# Patient Record
Sex: FEMALE | Race: WHITE | Employment: FULL TIME | URBAN - NONMETROPOLITAN AREA
[De-identification: names, ages, dates, MRNs, and addresses within clinical notes are randomized per-mention and may not be internally consistent; named-entity substitution may affect disease eponyms.]

---

## 2018-05-14 PROBLEM — K35.80 ACUTE APPENDICITIS: Status: ACTIVE | Noted: 2018-05-14

## 2018-05-14 PROBLEM — K35.30 ACUTE APPENDICITIS WITH LOCALIZED PERITONITIS: Status: ACTIVE | Noted: 2018-05-14

## 2019-01-28 ENCOUNTER — HOSPITAL ENCOUNTER (EMERGENCY)
Age: 29
Discharge: HOME | End: 2019-01-28
Attending: EMERGENCY MEDICINE

## 2019-01-28 VITALS
HEART RATE: 77 BPM | WEIGHT: 207 LBS | HEIGHT: 61 IN | BODY MASS INDEX: 39.08 KG/M2 | RESPIRATION RATE: 16 BRPM | SYSTOLIC BLOOD PRESSURE: 112 MMHG | DIASTOLIC BLOOD PRESSURE: 64 MMHG | OXYGEN SATURATION: 97 % | TEMPERATURE: 98.1 F

## 2019-01-28 DIAGNOSIS — G43.009 MIGRAINE WITHOUT AURA AND WITHOUT STATUS MIGRAINOSUS, NOT INTRACTABLE: Primary | ICD-10-CM

## 2019-01-28 PROCEDURE — 6370000000 HC RX 637 (ALT 250 FOR IP): Performed by: EMERGENCY MEDICINE

## 2019-01-28 PROCEDURE — 6360000002 HC RX W HCPCS: Performed by: EMERGENCY MEDICINE

## 2019-01-28 PROCEDURE — 96372 THER/PROPH/DIAG INJ SC/IM: CPT

## 2019-01-28 PROCEDURE — 99283 EMERGENCY DEPT VISIT LOW MDM: CPT

## 2019-01-28 RX ORDER — DEXAMETHASONE 4 MG/1
10 TABLET ORAL ONCE
Status: COMPLETED | OUTPATIENT
Start: 2019-01-28 | End: 2019-01-28

## 2019-01-28 RX ORDER — RIZATRIPTAN BENZOATE 10 MG/1
10 TABLET ORAL
Qty: 9 TABLET | Refills: 5 | Status: SHIPPED | OUTPATIENT
Start: 2019-01-28 | End: 2019-03-04 | Stop reason: SDUPTHER

## 2019-01-28 RX ORDER — DIPHENHYDRAMINE HCL 25 MG
50 TABLET ORAL ONCE
Status: COMPLETED | OUTPATIENT
Start: 2019-01-28 | End: 2019-01-28

## 2019-01-28 RX ADMIN — DEXAMETHASONE 10 MG: 4 TABLET ORAL at 12:05

## 2019-01-28 RX ADMIN — DIPHENHYDRAMINE HYDROCHLORIDE 50 MG: 25 CAPSULE ORAL at 12:06

## 2019-01-28 RX ADMIN — PROCHLORPERAZINE EDISYLATE 10 MG: 5 INJECTION INTRAMUSCULAR; INTRAVENOUS at 12:06

## 2019-01-28 ASSESSMENT — PAIN SCALES - GENERAL
PAINLEVEL_OUTOF10: 7
PAINLEVEL_OUTOF10: 5

## 2019-01-28 ASSESSMENT — ENCOUNTER SYMPTOMS
PHOTOPHOBIA: 1
NAUSEA: 1

## 2019-03-04 ENCOUNTER — APPOINTMENT (OUTPATIENT)
Dept: CT IMAGING | Age: 29
End: 2019-03-04

## 2019-03-04 ENCOUNTER — HOSPITAL ENCOUNTER (EMERGENCY)
Age: 29
Discharge: HOME | End: 2019-03-04
Attending: EMERGENCY MEDICINE

## 2019-03-04 VITALS
HEART RATE: 82 BPM | TEMPERATURE: 98.1 F | BODY MASS INDEX: 43.43 KG/M2 | OXYGEN SATURATION: 98 % | SYSTOLIC BLOOD PRESSURE: 122 MMHG | WEIGHT: 230 LBS | DIASTOLIC BLOOD PRESSURE: 78 MMHG | RESPIRATION RATE: 16 BRPM | HEIGHT: 61 IN

## 2019-03-04 DIAGNOSIS — G43.109 MIGRAINE WITH AURA AND WITHOUT STATUS MIGRAINOSUS, NOT INTRACTABLE: Primary | ICD-10-CM

## 2019-03-04 PROCEDURE — 96375 TX/PRO/DX INJ NEW DRUG ADDON: CPT

## 2019-03-04 PROCEDURE — 99284 EMERGENCY DEPT VISIT MOD MDM: CPT

## 2019-03-04 PROCEDURE — 6360000002 HC RX W HCPCS: Performed by: EMERGENCY MEDICINE

## 2019-03-04 PROCEDURE — 6370000000 HC RX 637 (ALT 250 FOR IP): Performed by: EMERGENCY MEDICINE

## 2019-03-04 PROCEDURE — 96374 THER/PROPH/DIAG INJ IV PUSH: CPT

## 2019-03-04 PROCEDURE — 70450 CT HEAD/BRAIN W/O DYE: CPT

## 2019-03-04 PROCEDURE — 2580000003 HC RX 258: Performed by: EMERGENCY MEDICINE

## 2019-03-04 RX ORDER — SODIUM CHLORIDE 0.9 % (FLUSH) 0.9 %
10 SYRINGE (ML) INJECTION 2 TIMES DAILY
Status: DISCONTINUED | OUTPATIENT
Start: 2019-03-04 | End: 2019-03-04 | Stop reason: HOSPADM

## 2019-03-04 RX ORDER — RIZATRIPTAN BENZOATE 10 MG/1
10 TABLET ORAL
Qty: 9 TABLET | Refills: 1 | Status: SHIPPED | OUTPATIENT
Start: 2019-03-04 | End: 2019-05-05

## 2019-03-04 RX ORDER — DIPHENHYDRAMINE HCL 25 MG
50 CAPSULE ORAL ONCE
Status: COMPLETED | OUTPATIENT
Start: 2019-03-04 | End: 2019-03-04

## 2019-03-04 RX ORDER — DIPHENHYDRAMINE HYDROCHLORIDE 50 MG/ML
25 INJECTION INTRAMUSCULAR; INTRAVENOUS ONCE
Status: DISCONTINUED | OUTPATIENT
Start: 2019-03-04 | End: 2019-03-04

## 2019-03-04 RX ORDER — DEXAMETHASONE SODIUM PHOSPHATE 4 MG/ML
10 INJECTION, SOLUTION INTRA-ARTICULAR; INTRALESIONAL; INTRAMUSCULAR; INTRAVENOUS; SOFT TISSUE ONCE
Status: COMPLETED | OUTPATIENT
Start: 2019-03-04 | End: 2019-03-04

## 2019-03-04 RX ADMIN — DEXAMETHASONE SODIUM PHOSPHATE 10 MG: 4 INJECTION, SOLUTION INTRAMUSCULAR; INTRAVENOUS at 09:31

## 2019-03-04 RX ADMIN — SODIUM CHLORIDE, PRESERVATIVE FREE 30 ML: 5 INJECTION INTRAVENOUS at 09:27

## 2019-03-04 RX ADMIN — DIPHENHYDRAMINE HYDROCHLORIDE 50 MG: 25 CAPSULE ORAL at 10:15

## 2019-03-04 RX ADMIN — PROCHLORPERAZINE EDISYLATE 10 MG: 5 INJECTION INTRAMUSCULAR; INTRAVENOUS at 09:31

## 2019-03-04 ASSESSMENT — PAIN DESCRIPTION - LOCATION: LOCATION: HEAD

## 2019-03-04 ASSESSMENT — ENCOUNTER SYMPTOMS: PHOTOPHOBIA: 1

## 2019-03-04 ASSESSMENT — PAIN SCALES - GENERAL: PAINLEVEL_OUTOF10: 8

## 2019-03-04 ASSESSMENT — PAIN DESCRIPTION - PAIN TYPE: TYPE: ACUTE PAIN

## 2019-03-04 ASSESSMENT — PAIN DESCRIPTION - FREQUENCY: FREQUENCY: CONTINUOUS

## 2019-05-05 ENCOUNTER — HOSPITAL ENCOUNTER (EMERGENCY)
Age: 29
Discharge: HOME | End: 2019-05-06
Attending: EMERGENCY MEDICINE

## 2019-05-05 DIAGNOSIS — M25.522 LEFT ELBOW PAIN: ICD-10-CM

## 2019-05-05 DIAGNOSIS — M25.512 ACUTE PAIN OF LEFT SHOULDER: ICD-10-CM
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
## 2019-05-05 DIAGNOSIS — M53.3 COCCYX PAIN: ICD-10-CM

## 2019-05-05 LAB
INTERPRETATION: NORMAL
PREGNANCY, URINE: NEGATIVE
SPECIFIC GRAVITY, URINE: 1.02 (ref 1–1.03)

## 2019-05-05 PROCEDURE — 81025 URINE PREGNANCY TEST: CPT

## 2019-05-05 PROCEDURE — 99284 EMERGENCY DEPT VISIT MOD MDM: CPT

## 2019-05-05 PROCEDURE — 6370000000 HC RX 637 (ALT 250 FOR IP): Performed by: EMERGENCY MEDICINE

## 2019-05-05 RX ORDER — SUMATRIPTAN 100 MG/1
100 TABLET, FILM COATED ORAL
COMMUNITY
End: 2022-08-08

## 2019-05-05 RX ORDER — ACETAMINOPHEN 500 MG
1000 TABLET ORAL ONCE
Status: COMPLETED | OUTPATIENT
Start: 2019-05-05 | End: 2019-05-05

## 2019-05-05 RX ADMIN — ACETAMINOPHEN 1000 MG: 500 TABLET ORAL at 22:56

## 2019-05-05 ASSESSMENT — PAIN SCALES - GENERAL
PAINLEVEL_OUTOF10: 5
PAINLEVEL_OUTOF10: 8

## 2019-05-05 ASSESSMENT — PAIN DESCRIPTION - ORIENTATION: ORIENTATION: LEFT

## 2019-05-05 ASSESSMENT — PAIN DESCRIPTION - PAIN TYPE: TYPE: ACUTE PAIN

## 2019-05-05 NOTE — LETTER
[facility] Emergency Department  [place] [address]  Phone: [phone]  Fax: [phone]             May 6, 2019    Patient: Aj Costello   YOB: 1990   Date of Visit: 5/5/2019       To Whom It May Concern:    Diamond Gregorio was seen and treated in our emergency department on 5/5/2019. She may return to work on 5-7-19.       Sincerely,             Signature:__________________________________

## 2019-05-06 ENCOUNTER — APPOINTMENT (OUTPATIENT)
Dept: GENERAL RADIOLOGY | Age: 29
End: 2019-05-06

## 2019-05-06 VITALS
RESPIRATION RATE: 18 BRPM | OXYGEN SATURATION: 98 % | HEART RATE: 88 BPM | TEMPERATURE: 97.6 F | SYSTOLIC BLOOD PRESSURE: 124 MMHG | BODY MASS INDEX: 43.43 KG/M2 | WEIGHT: 230 LBS | HEIGHT: 61 IN | DIASTOLIC BLOOD PRESSURE: 70 MMHG

## 2019-05-06 PROCEDURE — 73080 X-RAY EXAM OF ELBOW: CPT

## 2019-05-06 PROCEDURE — 72220 X-RAY EXAM SACRUM TAILBONE: CPT

## 2019-05-06 PROCEDURE — 73030 X-RAY EXAM OF SHOULDER: CPT

## 2019-05-06 NOTE — ED PROVIDER NOTES
EMERGENCY DEPARTMENT H&P    Patient Name:  Yogesh Rasihd  :  1990  MRN:  [id number]  Date of Visit:  2019    Triage Chief Complaint:   Fall (fell in the shower about . pain to tailbone and left shoulder and elbow. left shoulder is the worst)    HPI:  Diamond Gregorio is a 29 y.o. female who presents for complaint of left shoulder, left elbow and coccyx pain that started after she fell in the shower around 8 PM this evening. She notes that she slipped on soap that was on the floor the shower and struck her left side against the shower wall and then fell and struck her bottom against the shower floor. She denies any head or neck injury. She denies pain or injury to any other areas. Pain is currently rated at 5/10 and has been constant since onset. Pain is worse with movement of her left upper extremity. She denies any skin changes such as wounds, bruising or erythema. She did not take anything for pain prior to arrival. She denies any other associated symptoms or history at this time. She has been ambulatory since the fall. She denies any extremity numbness, tingling or weakness. ROS:  Review of Systems  At least 6 point ROS was performed and is negative except as stated in HPI above. Review of systems obtained from the patient.      Past Medical History:   Diagnosis Date    Headache      Past Surgical History:   Procedure Laterality Date    APPENDECTOMY      KNEE SURGERY Right     LAPAROSCOPIC APPENDECTOMY  2018    TONSILLECTOMY       Family History   Problem Relation Age of Onset    Diabetes Father     Heart Disease Father      Social History     Socioeconomic History    Marital status:      Spouse name: Not on file    Number of children: Not on file    Years of education: Not on file    Highest education level: Not on file   Occupational History    Not on file   Social Needs    Financial resource strain: Not on file    Food insecurity:     Worry: Not on file Inability: Not on file    Transportation needs:     Medical: Not on file     Non-medical: Not on file   Tobacco Use    Smoking status: Never Smoker    Smokeless tobacco: Never Used   Substance and Sexual Activity    Alcohol use: No    Drug use: No    Sexual activity: Not on file   Lifestyle    Physical activity:     Days per week: Not on file     Minutes per session: Not on file    Stress: Not on file   Relationships    Social connections:     Talks on phone: Not on file     Gets together: Not on file     Attends Confucianist service: Not on file     Active member of club or organization: Not on file     Attends meetings of clubs or organizations: Not on file     Relationship status: Not on file    Intimate partner violence:     Fear of current or ex partner: Not on file     Emotionally abused: Not on file     Physically abused: Not on file     Forced sexual activity: Not on file   Other Topics Concern    Not on file   Social History Narrative    Not on file     No current facility-administered medications for this encounter. Current Outpatient Medications   Medication Sig Dispense Refill    SUMAtriptan (IMITREX) 100 MG tablet Take 100 mg by mouth once as needed for Migraine       Allergies   Allergen Reactions    Benadryl [Diphenhydramine]      Fever and shaking       Physical Exam:  ED TRIAGE VITALS  BP: 117/71, Temp: 97.6 °F (36.4 °C), Pulse: 93, Resp: 18, SpO2: 97 %  Physical Exam   Musculoskeletal:        Left shoulder: She exhibits decreased range of motion and tenderness. She exhibits no swelling, no effusion, no crepitus and no deformity. Left elbow: She exhibits normal range of motion, no swelling, no effusion and no deformity. Tenderness found. Lateral epicondyle and olecranon process tenderness noted. No radial head and no medial epicondyle tenderness noted. Left wrist: Normal.        Left hand: Normal.   No TTP of the left clavicle.  There is TTP of the coccyx noted with no deformities noted. GENERAL: Appears stated age, awake and alert, no acute distress, non-toxic appearing  HEENT: NC / AT. No signs of head trauma. Oropharynx unremarkable. NECK: Trachea midline. . Normal ROM testing without pain. No midline or paraspinal TTP noted. CARDIOVASCULAR: RRR. Normal s1/s2. No murmur. Peripheral pulses and perfusion intact. No LE edema noted. RESPIRATORY: Lungs clear to auscultation bilaterally. No respiratory distress or accessory muscle usage. ABDOMEN: Soft, no tenderness to palpation noted. Non-distended, no guarding / rebound. SKIN: Warm. Dry. Intact. No obvious skin abnormalities noted. BACK: no lumbar or thoracic paraspinal / mid-line vertebral / flank tenderness noted. NEURO: The patient is awake, alert, interactive. Follows commands and answers questions appropriately. Speech is fluent with intact cognition. I have reviewed and interpreted all of the currently available lab results from this visit:  Results for orders placed or performed during the hospital encounter of 05/05/19   Urine HCG, if >12yrs and premenopausal   Result Value Ref Range    Pregnancy, Urine NEGATIVE NEGATIVE    Specific Gravity, Urine 1.023 1.001 - 1.035    Interpretation       HCG METHOD LIMITATIONS:  Very dilute specimens, as indicated  by low specific gravity, may have  insufficient concentration of HCG  to bring about a positive result. Radiographs:  Radiologist's Report Reviewed:  XR ELBOW LEFT (MIN 3 VIEWS)   Final Result   No fracture detected. XR SHOULDER LEFT (MIN 2 VIEWS)   Final Result   No fracture detected. XR SACRUM COCCYX (MIN 2 VIEWS)   Final Result   No fracture detected. Medications administered:  Medications   acetaminophen (TYLENOL) tablet 1,000 mg (1,000 mg Oral Given 5/5/19 5603)       ED COURSE & MDM:  Nursing notes and vital signs were reviewed.  The patient's medical record and available pertinent information was also reviewed if available. Pt presents with stable VS. Please see history and exam above. Workup initiated as above with x-rays of the left elbow, left shoulder and coccyx. Ice pack was applied. Tylenol was given for pain at patient's request.    All x-rays reviewed, unremarkable for fracture, dislocation or other acute findings noted at this time. I do not feel that further workup is indicated at this time. Patient feels comfortable with discharge home. I recommended rest, ice, ibuprofen and Tylenol as well as follow-up with her PCP. At this time, I believe the patient is stable for discharge. Written and verbal instructions regarding the patient's diagnosis, plans for further treatment, follow-up, and reasons to return to the emergency department were provided. All questions were answered to their satisfaction. They were agreeable to all plans and instructions. Patient discharged in medically stable condition. Clinical Impression:  1. Fall, initial encounter    2. Acute pain of left shoulder    3. Left elbow pain    4. Coccyx pain      Disposition referral (if applicable): Rasheed Clayton [place] [address]  [phone]    Schedule an appointment as soon as possible for a visit       [facility] Emergency Department  [address] [place] [address]  [phone]  Go to   As needed    Comment: Please note this report has been produced using speech recognition software and may contain errors related to that system including errors in grammar, punctuation, and spelling, as well as words and phrases that may be inappropriate. If there are any questions or concerns please feel free to contact the dictating provider for clarification.     Electronically Signed:        Aric Palmer DO  05/06/19 1438

## 2019-05-27 ENCOUNTER — APPOINTMENT (OUTPATIENT)
Dept: GENERAL RADIOLOGY | Age: 29
End: 2019-05-27
Payer: COMMERCIAL

## 2019-05-27 ENCOUNTER — HOSPITAL ENCOUNTER (EMERGENCY)
Age: 29
Discharge: HOME | End: 2019-05-27
Attending: EMERGENCY MEDICINE
Payer: COMMERCIAL

## 2019-05-27 VITALS
RESPIRATION RATE: 16 BRPM | TEMPERATURE: 98.2 F | OXYGEN SATURATION: 96 % | WEIGHT: 225 LBS | HEART RATE: 71 BPM | DIASTOLIC BLOOD PRESSURE: 90 MMHG | SYSTOLIC BLOOD PRESSURE: 123 MMHG | BODY MASS INDEX: 42.48 KG/M2 | HEIGHT: 61 IN

## 2019-05-27 DIAGNOSIS — S60.042A CONTUSION OF LEFT RING FINGER WITHOUT DAMAGE TO NAIL, INITIAL ENCOUNTER: Primary | ICD-10-CM

## 2019-05-27 PROCEDURE — 73140 X-RAY EXAM OF FINGER(S): CPT

## 2019-05-27 PROCEDURE — 4500000027

## 2019-05-27 PROCEDURE — 99283 EMERGENCY DEPT VISIT LOW MDM: CPT

## 2019-05-27 PROCEDURE — 6370000000 HC RX 637 (ALT 250 FOR IP): Performed by: EMERGENCY MEDICINE

## 2019-05-27 RX ORDER — IBUPROFEN 600 MG/1
600 TABLET ORAL EVERY 6 HOURS PRN
Qty: 20 TABLET | Refills: 0 | Status: SHIPPED | OUTPATIENT
Start: 2019-05-27 | End: 2022-08-08

## 2019-05-27 RX ORDER — IBUPROFEN 800 MG/1
800 TABLET ORAL ONCE
Status: COMPLETED | OUTPATIENT
Start: 2019-05-27 | End: 2019-05-27

## 2019-05-27 RX ADMIN — IBUPROFEN 800 MG: 800 TABLET, FILM COATED ORAL at 07:51

## 2019-05-27 ASSESSMENT — PAIN DESCRIPTION - PAIN TYPE: TYPE: ACUTE PAIN

## 2019-05-27 ASSESSMENT — PAIN DESCRIPTION - DESCRIPTORS: DESCRIPTORS: CONSTANT;ACHING;THROBBING

## 2019-05-27 ASSESSMENT — PAIN SCALES - GENERAL: PAINLEVEL_OUTOF10: 5

## 2019-05-27 ASSESSMENT — PAIN DESCRIPTION - LOCATION: LOCATION: FINGER (COMMENT WHICH ONE)

## 2019-05-27 ASSESSMENT — PAIN DESCRIPTION - ORIENTATION: ORIENTATION: LEFT

## 2019-05-27 NOTE — ED NOTES
Discharged with instructions and rx. Pt acknowledges understanding. Ambulatory at discharge.       Fransisco Huynh RN  05/27/19 8744

## 2019-05-27 NOTE — ED PROVIDER NOTES
Triage Chief Complaint:   Hand Injury (injured ring finger of left hand when window slammed on finger at work)      GREG Joy is a 29 y.o. 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 She does have a small abrasion to her left 4th middle phalanx. No open wound. No laceration. No bruising. States pain is aching, throbbing, 5 out of 10. States mild tingling to this area but able to move her finger fully.     Past Medical History:   Diagnosis Date    Headache      Past Surgical History:   Procedure Laterality Date    APPENDECTOMY      KNEE SURGERY Right     LAPAROSCOPIC APPENDECTOMY  05/14/2018    TONSILLECTOMY       Family History   Problem Relation Age of Onset    Diabetes Father     Heart Disease Father      Social History     Socioeconomic History    Marital status:      Spouse name: Not on file    Number of children: Not on file    Years of education: Not on file    Highest education level: Not on file   Occupational History    Not on file   Social Needs    Financial resource strain: Not on file    Food insecurity:     Worry: Not on file     Inability: Not on file    Transportation needs:     Medical: Not on file     Non-medical: Not on file   Tobacco Use    Smoking status: Never Smoker    Smokeless tobacco: Never Used   Substance and Sexual Activity    Alcohol use: No    Drug use: No    Sexual activity: Yes     Partners: Male   Lifestyle    Physical activity:     Days per week: Not on file     Minutes per session: Not on file    Stress: Not on file   Relationships    Social connections:     Talks on phone: Not on file     Gets together: Not on file     Attends Voodoo service: Not on file     Active member of club or organization: Not on file     Attends meetings of clubs or organizations: Not on file Relationship status: Not on file    Intimate partner violence:     Fear of current or ex partner: Not on file     Emotionally abused: Not on file     Physically abused: Not on file     Forced sexual activity: Not on file   Other Topics Concern    Not on file   Social History Narrative    Not on file     No current facility-administered medications for this encounter. Current Outpatient Medications   Medication Sig Dispense Refill    ibuprofen (IBU) 600 MG tablet Take 1 tablet by mouth every 6 hours as needed for Pain 20 tablet 0    SUMAtriptan (IMITREX) 100 MG tablet Take 100 mg by mouth once as needed for Migraine       Allergies   Allergen Reactions    Benadryl [Diphenhydramine]      Fever and shaking     Nursing Notes Reviewed    ROS:  At least 10 systems reviewed and otherwise negative except as in the [address] Physical Exam:  ED Triage Vitals   Enc Vitals Group      BP       Pulse       Resp       Temp       Temp src       SpO2       Weight       Height       Head Circumference       Peak Flow       Pain Score       Pain Loc       Pain Edu? Excl. in [address]? My pulse oximetry interpretation is which is within the normal range    GENERAL APPEARANCE: Awake and alert. Cooperative. No acute distress. HEAD: Normocephalic. Atraumatic. EYES: EOM's grossly intact. Sclera anicteric. ENT: Mucous membranes are moist. Tolerates saliva. No trismus. NECK: Supple. No meningismus. Trachea midline. HEART: RRR. Radial pulses 2+. LUNGS: Respirations unlabored. CTAB  ABDOMEN: Soft. Non-tender. No guarding or rebound. EXTREMITIES: Small abrasion to the left fourth middle phalanx. No bruising. No open wound. Good Refill. Able to bend and extend fully. Sensation intact. SKIN: Warm and dry. NEUROLOGICAL: No gross facial drooping. Moves all 4 extremities spontaneously. PSYCHIATRIC: Normal mood.     I have reviewed and interpreted all of the currently available lab results from this visit (if applicable):  No results found for this visit on 05/27/19. Radiographs:  [] Radiologist's Wet Read Report Reviewed:      XR FINGER LEFT (MIN 2 VIEWS) (Preliminary result)   Result time 05/27/19 09:01:57   Preliminary result by Iris Reina MD (05/27/19 09:01:57)                Impression:    No acute fracture or dislocation. Narrative:    EXAMINATION:  3 XRAY VIEWS OF THE LEFT FINGERS    5/27/2019 8:26 am    COMPARISON:  None    HISTORY:  ORDERING SYSTEM PROVIDED HISTORY: 4th digit, slammed in window at [employer]  TECHNOLOGIST PROVIDED HISTORY:  Reason for exam:->4th digit, slammed in window at [employer]  Acuity: Acute  Type of Exam: Initial  Mechanism of Injury: smashed LT ring finger in sliding drivethru window,    FINDINGS:  Soft tissue swelling present involving the 4th finger. No acute fracture or dislocation. Joint alignment and joint spaces are maintained. No erosions are present.                Preliminary result by Iris Reina MD (05/27/19 08:59:26)                Impression:    No acute fracture or dislocation                      [] Discussed with Radiologist:     [] The following radiograph was interpreted by myself in the absence of a radiologist:     EKG: (All EKG's are interpreted by myself in the absence of a cardiologist)      MDM:  Patient's vital signs are stable. Did give 800 mg Motrin. Imaging obtained which showed no acute fracture or dislocation. We'll treat with anti-inflammatories and recommended ice. We'll give her an AlumaFoam splint to help her protect her finger. Follow up PCP. Clinical Impression:  1. Contusion of left ring finger without damage to nail, initial encounter        Disposition Vitals:  [unfilled], [unfilled], [unfilled], [unfilled]    Disposition referral (if applicable): Carmelo Clayton [place] [address] [phone]    Schedule an appointment as soon as possible for a visit         Disposition medications (if applicable):  New Prescriptions    IBUPROFEN (IBU) 600 MG TABLET    Take 1 tablet by mouth every 6 hours as needed for Pain         (Please note that portions of this note may have been completed with a voice recognition program. Efforts were made to edit the dictations but occasionally words are mis-transcribed.)    MD Donna Oviedo MD  05/27/19 1675

## 2019-05-27 NOTE — ED NOTES
Ring finger of left hand splinted and wrapped w/ kerlix after rings removed. Pt has her rings.       Shu Watson RN  05/27/19 8530

## 2019-05-27 NOTE — ED NOTES
Ring finger of left finger edematous and unable to move. Still able to turn rings on this finger.   Ice to left hand and hand elevated on pillow     Jc Ng RN  05/27/19 3973

## 2020-07-06 ENCOUNTER — HOSPITAL ENCOUNTER (EMERGENCY)
Age: 30
Discharge: HOME | End: 2020-07-07
Attending: EMERGENCY MEDICINE
Payer: COMMERCIAL

## 2020-07-06 ENCOUNTER — APPOINTMENT (OUTPATIENT)
Dept: GENERAL RADIOLOGY | Age: 30
End: 2020-07-06
Payer: COMMERCIAL

## 2020-07-06 VITALS
HEART RATE: 96 BPM | BODY MASS INDEX: 42.51 KG/M2 | WEIGHT: 225 LBS | OXYGEN SATURATION: 98 % | SYSTOLIC BLOOD PRESSURE: 120 MMHG | DIASTOLIC BLOOD PRESSURE: 78 MMHG | TEMPERATURE: 98 F | RESPIRATION RATE: 18 BRPM

## 2020-07-06 PROCEDURE — 6370000000 HC RX 637 (ALT 250 FOR IP): Performed by: EMERGENCY MEDICINE

## 2020-07-06 PROCEDURE — 73610 X-RAY EXAM OF ANKLE: CPT

## 2020-07-06 PROCEDURE — 99283 EMERGENCY DEPT VISIT LOW MDM: CPT

## 2020-07-06 PROCEDURE — 73630 X-RAY EXAM OF FOOT: CPT

## 2020-07-06 RX ORDER — HYDROCODONE BITARTRATE AND ACETAMINOPHEN 5; 325 MG/1; MG/1
1 TABLET ORAL ONCE
Status: COMPLETED | OUTPATIENT
Start: 2020-07-06 | End: 2020-07-06

## 2020-07-06 RX ADMIN — HYDROCODONE BITARTRATE AND ACETAMINOPHEN 1 TABLET: 5; 325 TABLET ORAL at 23:29

## 2020-07-06 ASSESSMENT — PAIN SCALES - GENERAL: PAINLEVEL_OUTOF10: 8

## 2020-07-06 NOTE — LETTER
[facility] Emergency Department  [address] [place] [address]  Phone: [phone]  Fax: [phone]               July 7, 2020    Patient: Ruth Bowser   YOB: 1990   Date of Visit: 7/6/2020       To Whom It May Concern:    Diamond Gregorio was seen and treated in our emergency department on 7/6/2020. Please excuse her  Karin Grider from work to care for his wife.   Sincerely,       Nurse      Signature:__________________________________

## 2020-07-07 NOTE — ED PROVIDER NOTES
Emergency Department Encounter    Patient: Wes Pickens  MRN: [id number]  : 1990  Date of Evaluation: 2020  ED Provider:  Faraz Zhang    Triage Chief Complaint:   Ankle Pain (left ankle pain pt states she fell in a hole 30 mins pta)    [place]:  Wes Pickens is a 34 y.o. female that presents with complaint of left ankle pain, she was taking out the trash, stepped into a hole, twisted ankle and then went forward. She had pain, nonweightbearing and swelling, came in to be evaluated. Had not taken anything for pain. It is sharp, worse with palpation and attempting to bear weight. It is over the lateral lower ankle and also extends into the left pinky toe. No pain over the medial aspect of the ankle or the foot. No pain over the shin or knee. Denies other injury. ROS - see HPI, below listed is current ROS at time of my eval:  4 systems reviewed and negative except as above.     Past Medical History:   Diagnosis Date    Headache      Past Surgical History:   Procedure Laterality Date    APPENDECTOMY      KNEE SURGERY Right     LAPAROSCOPIC APPENDECTOMY  2018    TONSILLECTOMY       Family History   Problem Relation Age of Onset    Diabetes Father     Heart Disease Father      Social History     Socioeconomic History    Marital status:      Spouse name: Not on file    Number of children: Not on file    Years of education: Not on file    Highest education level: Not on file   Occupational History    Not on file   Social Needs    Financial resource strain: Not on file    Food insecurity     Worry: Not on file     Inability: Not on file    Transportation needs     Medical: Not on file     Non-medical: Not on file   Tobacco Use    Smoking status: Never Smoker    Smokeless tobacco: Never Used   Substance and Sexual Activity    Alcohol use: No    Drug use: No    Sexual activity: Yes     Partners: Male   Lifestyle    Physical activity     Days per week: Not on file Minutes per session: Not on file    Stress: Not on file   Relationships    Social connections     Talks on phone: Not on file     Gets together: Not on file     Attends Adventism service: Not on file     Active member of club or organization: Not on file     Attends meetings of clubs or organizations: Not on file     Relationship status: Not on file    Intimate partner violence     Fear of current or ex partner: Not on file     Emotionally abused: Not on file     Physically abused: Not on file     Forced sexual activity: Not on file   Other Topics Concern    Not on file   Social History Narrative    Not on file     No current facility-administered medications for this encounter. Current Outpatient Medications   Medication Sig Dispense Refill    ibuprofen (IBU) 600 MG tablet Take 1 tablet by mouth every 6 hours as needed for Pain 20 tablet 0    SUMAtriptan (IMITREX) 100 MG tablet Take 100 mg by mouth once as needed for Migraine       Allergies   Allergen Reactions    Benadryl [Diphenhydramine]      Fever and shaking       Nursing Notes Reviewed    Physical Exam:  Triage VS:    ED Triage Vitals [07/06/20 9867]   Enc Vitals Group      /78      Pulse 96      Resp 18      Temp 98 °F (36.7 °C)      Temp Source Oral      SpO2 98 %      Weight 225 lb (102.1 kg)      Height       Head Circumference       Peak Flow       Pain Score       Pain Loc       Pain Edu? Excl. in [address]? My pulse ox interpretation is - normal    General appearance:  No acute distress. Skin:  Warm. Dry. Eye:  Extraocular movements intact. Ears, nose, mouth and throat:  Oral mucosa moist   Neck:  Trachea midline. Extremity: Mild swelling noted over the left lateral malleolus with mild tenderness to palpation. Does have tenderness extending over the lateral aspect of the foot and fifth metatarsal without swelling over the distal foot. She is able to move her toes normally. Sensation intact distally in the toes. Pulses at [facility] and PT artery are normal.  No proximal ankle tenderness, no proximal ankle swelling, Achilles without tenderness or swelling. normal ROM     Heart:  Regular rate and rhythm  Perfusion:  intact  Respiratory:  Respirations nonlabored. Abdominal:    Non distended. Neurological:  Alert and oriented          Psychiatric:  Appropriate    I have reviewed and interpreted all of the currently available lab results from this visit (if applicable):  No results found for this visit on 07/06/20. Radiographs (if obtained):  Radiologist's Report Reviewed:  No results found. EKG (if obtained): (All EKG's are interpreted by myself in the absence of a cardiologist)      MDM:  63-year-old female complains of left ankle and foot pain, stepped into a hole and twisted. Very mild swelling noted on exam, x-ray of ankle and foot show no evidence of bony fracture. Plan for Ace wrap, crutches with weightbearing as tolerated. Advised typically with sprains would improve over the next 3 to 7 days. If not able to bear weight, still having significant pain, then would need to repeat x-ray as sometimes there is a hairline fracture that we are not able to visualize initially. She does express understanding of this. However if improving then would be able to slowly start weightbearing and go back to normal activity. Plan for follow-up with her primary care. Given return precautions. She is comfortable with plan    Clinical Impression:  1. Sprain of left foot, initial encounter    2. Sprain of left ankle, unspecified ligament, initial encounter      Disposition referral (if applicable): Daryn Hopper  [address]  [address] Emergency Department  [address] [address] [phone]    If symptoms worsen    Disposition medications (if applicable):  New Prescriptions    No medications on file     ED Provider Disposition Time  DISPOSITION Comment: Please note this report has been produced using speech recognition software and may contain errors related to that system including errors in grammar, punctuation, and spelling, as well as words and phrases that may be inappropriate. Efforts were made to edit the dictations.         Selma Diaz MD  07/07/20 0010

## 2021-04-01 PROCEDURE — 99283 EMERGENCY DEPT VISIT LOW MDM: CPT

## 2021-04-02 ENCOUNTER — HOSPITAL ENCOUNTER (EMERGENCY)
Age: 31
Discharge: HOME | End: 2021-04-02
Attending: EMERGENCY MEDICINE
Payer: COMMERCIAL

## 2021-04-02 VITALS
BODY MASS INDEX: 47.2 KG/M2 | WEIGHT: 250 LBS | HEIGHT: 61 IN | SYSTOLIC BLOOD PRESSURE: 139 MMHG | OXYGEN SATURATION: 98 % | RESPIRATION RATE: 16 BRPM | HEART RATE: 87 BPM | TEMPERATURE: 98.6 F | DIASTOLIC BLOOD PRESSURE: 96 MMHG

## 2021-04-02 DIAGNOSIS — N89.8 VAGINAL DISCHARGE: Primary | ICD-10-CM

## 2021-04-02 LAB
INTERPRETATION: NORMAL
PREGNANCY, URINE: NEGATIVE
SPECIFIC GRAVITY, URINE: 1.01 (ref 1–1.03)

## 2021-04-02 PROCEDURE — 81025 URINE PREGNANCY TEST: CPT

## 2021-04-02 NOTE — ED PROVIDER NOTES
Triage Chief Complaint:   Vaginal Discharge (Patient states something came out of her vagina and she is concerned about it/.)      HPI:  Diamond Gregorio is a 27 y.o. female that presents with vaginal discharge. She states that she had just had intercourse when she wiped and noticed a bloody discharge that \"looked like a slug\". She denies abdominal pain, pelvic pain or vaginal pain. States that she was not having discharge prior to this. States she just had her period and just finished it on Monday of this week. She states she just wanted to make sure there is nothing wrong. She denies any concern for sexually transmitted infection. Denies urinary symptoms.     ROS:  General:  No fevers, no chills  Eyes:  No recent vison changes  ENT:  No sore throat, no nasal congestion  Cardiovascular:  No chest pain, no palpitations  Respiratory:  No shortness of breath, no cough  Gastrointestinal:  No pain, no nausea, no vomiting, no diarrhea  Musculoskeletal:  No muscle pain, no joint pain  Skin:  No rash, no pruritis, no easy bruising  Neurologic:  No headache or weakness  Genitourinary:  No dysuria, no hematuria  Extremities:  no edema, no pain    Past Medical History:   Diagnosis Date    Headache      Past Surgical History:   Procedure Laterality Date    APPENDECTOMY      KNEE SURGERY Right     LAPAROSCOPIC APPENDECTOMY  05/14/2018    TONSILLECTOMY       Family History   Problem Relation Age of Onset    Diabetes Father     Heart Disease Father      Social History     Socioeconomic History    Marital status:      Spouse name: Not on file    Number of children: Not on file    Years of education: Not on file    Highest education level: Not on file   Occupational History    Not on file   Social Needs    Financial resource strain: Not on file    Food insecurity     Worry: Not on file     Inability: Not on file    Transportation needs     Medical: Not on file     Non-medical: Not on file   Tobacco Use rash.  Neurological:  Alert and oriented. No focal deficits. Speech normal.  Psyc: Normal mood and affect    I have reviewed and interpreted all of the currently available lab results from this visit (if applicable):  No results found for this visit on 04/02/21. Chart review shows recent radiographs:  No results found. MDM:  This is a 79-year-old female who presented due to an abnormal appearing vaginal discharge after having intercourse. She just wanted to make sure that she did miscarry. Her hCG is negative. I do suspect this is likely to be bleeding discharge secondary to just having intercourse. She is well-appearing and nontoxic and has no other complaints. She has no abdominal, pelvic or vaginal pain. She has no concern for sexually transmitted infection. I offered her testing for this. She does not desire that. Will discharge home at this point with a negative hCG and instructions to follow-up outpatient with OB/GYN as needed.       Clinical Impression:  Vaginal discharge      (Please note that portions of this note may have been completed with a voice recognition program. Efforts were made to edit the dictations but occasionally words are mis-transcribed.)      Ida Arguello DO  04/02/21 0612

## 2022-08-08 ENCOUNTER — HOSPITAL ENCOUNTER (EMERGENCY)
Age: 32
Discharge: HOME | End: 2022-08-08
Attending: EMERGENCY MEDICINE
Payer: COMMERCIAL

## 2022-08-08 VITALS
SYSTOLIC BLOOD PRESSURE: 125 MMHG | BODY MASS INDEX: 43.61 KG/M2 | DIASTOLIC BLOOD PRESSURE: 83 MMHG | WEIGHT: 237 LBS | RESPIRATION RATE: 16 BRPM | HEART RATE: 74 BPM | OXYGEN SATURATION: 97 % | HEIGHT: 62 IN | TEMPERATURE: 97.9 F

## 2022-08-08 DIAGNOSIS — H53.9 VISUAL AURA: Primary | ICD-10-CM

## 2022-08-08 PROCEDURE — 99283 EMERGENCY DEPT VISIT LOW MDM: CPT

## 2022-08-08 PROCEDURE — 6370000000 HC RX 637 (ALT 250 FOR IP): Performed by: EMERGENCY MEDICINE

## 2022-08-08 RX ORDER — NAPROXEN 500 MG/1
500 TABLET ORAL ONCE
Status: COMPLETED | OUTPATIENT
Start: 2022-08-08 | End: 2022-08-08

## 2022-08-08 RX ADMIN — NAPROXEN 500 MG: 500 TABLET ORAL at 08:05

## 2022-08-08 ASSESSMENT — ENCOUNTER SYMPTOMS
GASTROINTESTINAL NEGATIVE: 1
EYE REDNESS: 0
PHOTOPHOBIA: 1
EYE ITCHING: 0
EYE DISCHARGE: 0
EYE PAIN: 0

## 2022-08-08 ASSESSMENT — PAIN SCALES - GENERAL: PAINLEVEL_OUTOF10: 0

## 2022-08-08 ASSESSMENT — PAIN - FUNCTIONAL ASSESSMENT: PAIN_FUNCTIONAL_ASSESSMENT: NONE - DENIES PAIN

## 2022-08-08 NOTE — ED TRIAGE NOTES
Arrived to room 4 for triage via wheelchair. Tolerated without difficulty. Bed in lowest position. Call light given. Gowned for exam, placed on cardia monitor.

## 2022-08-08 NOTE — ED NOTES
Discharge instructions reviewed with patient and patients . No additional questions asked. Voiced understanding. Encouraged patient to follow up as discussed by the ED physician.      Tennille Avelar RN  08/08/22 7114

## 2022-08-08 NOTE — ED PROVIDER NOTES
Triage Chief Complaint:   Vision Problem (Patient states around 0615 she was at work talking with cowokers when \"everything became zoomed in'\" nauseated, diaphoretic lasting approx 1-2 minutes. Now patient states she has blurred vision, unsteady. )    [place]:  Sangeeta Banerjee is a 32 y.o. female that presents to the ED with a complaint the at the end of her shift after working all night that she had a little blurry vision and her right vision loss of vision in her lateral eye for a few minutes and then just developed like she was in a tunnel she had a very mild headache not severe she has a history of migraines no numbness tingling weakness no problems with speech thought cognition no history of sudden death no unexplainable events no history of cerebral aneurysms.   Patient is currently back to normal just feels a little bit dazed        Past Medical History:   Diagnosis Date    Fatty liver     Headache     PCOS (polycystic ovarian syndrome)      Past Surgical History:   Procedure Laterality Date    APPENDECTOMY      KNEE SURGERY Right     LAPAROSCOPIC APPENDECTOMY  05/14/2018    TONSILLECTOMY       Family History   Problem Relation Age of Onset    Diabetes Father     Heart Disease Father      Social History     Socioeconomic History    Marital status:      Spouse name: Not on file    Number of children: Not on file    Years of education: Not on file    Highest education level: Not on file   Occupational History    Not on file   Tobacco Use    Smoking status: Never    Smokeless tobacco: Never   Vaping Use    Vaping Use: Never used   Substance and Sexual Activity    Alcohol use: No    Drug use: No    Sexual activity: Yes     Partners: Male   Other Topics Concern    Not on file   Social History Narrative    Not on file     Social Determinants of Health     Financial Resource Strain: Not on file   Food Insecurity: Not on file   Transportation Needs: Not on file   Physical Activity: Not on file   Stress: Not on file   Social Connections: Not on file   Intimate Partner Violence: Not on file   Housing Stability: Not on file     Current Facility-Administered Medications   Medication Dose Route Frequency Provider Last Rate Last Admin    naproxen (NAPROSYN) tablet 500 mg  500 mg Oral Once Rhunette Riis, DO         No current outpatient medications on file. Allergies   Allergen Reactions    Benadryl [Diphenhydramine]      Fever and shaking         ROS:    Review of Systems   Eyes:  Positive for photophobia and visual disturbance. Negative for pain, discharge, redness and itching. Gastrointestinal: Negative. Neurological:  Positive for headaches. Negative for dizziness, speech difficulty and weakness. All other systems reviewed and are negative. Nursing Notes Reviewed    Physical Exam:      ED Triage Vitals   Enc Vitals Group      BP       Pulse       Resp       Temp       Temp src       SpO2       Weight       Height       Head Circumference       Peak Flow       Pain Score       Pain Loc       Pain Edu? Excl. in [address]? Physical Exam  Vitals and nursing note reviewed. Constitutional:       Appearance: She is well-developed. She is obese. HENT:      Head: Normocephalic and atraumatic. Right Ear: External ear normal.      Left Ear: External ear normal.   Eyes:      General: No scleral icterus. Right eye: No discharge. Left eye: No discharge. Extraocular Movements: Extraocular movements intact. Conjunctiva/sclera: Conjunctivae normal.      Pupils: Pupils are equal, round, and reactive to light. Neck:      Thyroid: No thyromegaly. Vascular: No JVD. Trachea: No tracheal deviation. Cardiovascular:      Rate and Rhythm: Normal rate and regular rhythm. Heart sounds: Normal heart sounds. No murmur heard. No friction rub. No gallop. Pulmonary:      Effort: Pulmonary effort is normal. No respiratory distress. Breath sounds: Normal breath sounds.  No stridor. No wheezing or rales. Chest:      Chest wall: No tenderness. Abdominal:      General: Bowel sounds are normal. There is no distension. Palpations: Abdomen is soft. There is no mass. Tenderness: There is no abdominal tenderness. There is no guarding or rebound. Hernia: No hernia is present. Musculoskeletal:         General: No tenderness or deformity. Normal range of motion. Cervical back: Normal range of motion and neck supple. Lymphadenopathy:      Cervical: No cervical adenopathy. Skin:     General: Skin is warm and dry. Coloration: Skin is not pale. Findings: No erythema or rash. Neurological:      General: No focal deficit present. Mental Status: She is alert and oriented to person, place, and time. Cranial Nerves: No cranial nerve deficit. Sensory: No sensory deficit. Motor: No weakness. Coordination: Coordination normal.      Gait: Gait normal.      Deep Tendon Reflexes: Reflexes are normal and symmetric. Reflexes normal.      Comments: HSN: neg   Psychiatric:         Speech: Speech normal.         Behavior: Behavior normal.         Thought Content: Thought content normal.         Judgment: Judgment normal.       I have reviewed and interpreted all of the currently available lab results from this visit (ifapplicable):  No results found for this visit on 08/08/22. Radiographs (if obtained):  [] The following radiograph wasinterpreted by myself in the absence of a radiologist:   [] Radiologist's Report Reviewed:  No orders to display         EKG (if obtained): (All EKG's are interpreted by myself in the absence of a cardiologist)    Chart review shows recent radiographs:  No results found. MDM:    Patient presents to the ED with acute visual aura that is since resolved she has a very trivial headache I have a high suspicion this is a or of migraine without headache.   Treat will be supportive patient is to go home and sleep rest will be given a dose of Naprosyn no other hyperesthesias red flags no concern for an acute ischemic stroke syndrome. My typical dicussion, presentation, and considerations for this patients' chief complaint, diagnosis, differential diagnosis, medications, medication use, medication safety and medication interactions have been explained and outlined to this patient for this patient encounter. I have stressed need for follow up and reexamination for this encounter and or return to the emergency department if any changes or any concern. I have discussed the findings of today's workup with the patient and present family members and have addressed their questions and concerns. Important warning signs as well as new or worsening symptoms which would necessitate immediate return to the ED were discussed. The plan is to discharge from the ED at this time, and the patient is in stable condition. The patient acknowledged understanding is agreeable with this plan. The patient and/or family and I have discussed the diagnosis and risks, and we agree with discharging home to follow-up with their primary care, specialist or referral doctor. Questions addressed. Disposition and follow-up agreed upon. Specific discharge instructions explained. We have discussed the symptoms which are most concerning that necessitate immediate return. We also discussed returning to the Emergency Department immediately if new or worsening symptoms occur. Clinical Impression:  1. Visual aura      Disposition referral (if applicable): [facility] [place] [id number]  [phone]    Schedule an appointment as soon as possible for a visit   If symptoms worsen  Disposition medications (if applicable):  New Prescriptions    No medications on file           Rolo Truong DO, FACEP      Comment: Please note this report has been produced using speech recognition software and maycontain errors related to that system including errors in grammar, punctuation, and spelling, as well as words and phrases that may be inappropriate. If there are any questions or concerns please feel free to contact thedictating provider for clarification.         Shabbir Tinoco DO  08/08/22 6659

## 2024-01-10 ENCOUNTER — APPOINTMENT (OUTPATIENT)
Dept: CT IMAGING | Age: 34
End: 2024-01-10
Attending: EMERGENCY MEDICINE

## 2024-01-10 ENCOUNTER — HOSPITAL ENCOUNTER (EMERGENCY)
Age: 34
Discharge: HOME | End: 2024-01-10
Attending: EMERGENCY MEDICINE

## 2024-01-10 VITALS
OXYGEN SATURATION: 100 % | WEIGHT: 250 LBS | BODY MASS INDEX: 47.2 KG/M2 | SYSTOLIC BLOOD PRESSURE: 118 MMHG | RESPIRATION RATE: 16 BRPM | HEIGHT: 61 IN | DIASTOLIC BLOOD PRESSURE: 83 MMHG | HEART RATE: 102 BPM | TEMPERATURE: 98.2 F

## 2024-01-10 DIAGNOSIS — R11.2 NAUSEA VOMITING AND DIARRHEA: ICD-10-CM

## 2024-01-10 DIAGNOSIS — R10.11 ABDOMINAL PAIN, RIGHT UPPER QUADRANT: Primary | ICD-10-CM

## 2024-01-10 DIAGNOSIS — R19.7 NAUSEA VOMITING AND DIARRHEA: ICD-10-CM

## 2024-01-10 LAB
ALBUMIN SERPL-MCNC: 4.3 GM/DL (ref 3.4–5)
ALP BLD-CCNC: 106 IU/L (ref 40–129)
ALT SERPL-CCNC: 30 U/L (ref 10–40)
ANION GAP SERPL CALCULATED.3IONS-SCNC: 13 MMOL/L (ref 7–16)
AST SERPL-CCNC: 23 IU/L (ref 15–37)
BASOPHILS ABSOLUTE: 0.1 K/CU MM
BASOPHILS RELATIVE PERCENT: 0.6 % (ref 0–1)
BILIRUB SERPL-MCNC: 0.2 MG/DL (ref 0–1)
BUN SERPL-MCNC: 15 MG/DL (ref 6–23)
CALCIUM SERPL-MCNC: 9.7 MG/DL (ref 8.3–10.6)
CHLORIDE BLD-SCNC: 103 MMOL/L (ref 99–110)
CO2: 26 MMOL/L (ref 21–32)
CREAT SERPL-MCNC: 0.5 MG/DL (ref 0.6–1.1)
DIFFERENTIAL TYPE: ABNORMAL
EOSINOPHILS ABSOLUTE: 0.3 K/CU MM
EOSINOPHILS RELATIVE PERCENT: 2.5 % (ref 0–3)
GFR SERPL CREATININE-BSD FRML MDRD: >60 ML/MIN/1.73M2
GLUCOSE SERPL-MCNC: 96 MG/DL (ref 70–99)
HCG QUALITATIVE: NEGATIVE
HCT VFR BLD CALC: 43.8 % (ref 37–47)
HEMOGLOBIN: 13.9 GM/DL (ref 12.5–16)
IMMATURE NEUTROPHIL %: 0.3 % (ref 0–0.43)
LIPASE: 25 IU/L (ref 13–60)
LYMPHOCYTES ABSOLUTE: 3.4 K/CU MM
LYMPHOCYTES RELATIVE PERCENT: 26.7 % (ref 24–44)
MCH RBC QN AUTO: 28.3 PG (ref 27–31)
MCHC RBC AUTO-ENTMCNC: 31.7 % (ref 32–36)
MCV RBC AUTO: 89 FL (ref 78–100)
MONOCYTES ABSOLUTE: 1 K/CU MM
MONOCYTES RELATIVE PERCENT: 7.7 % (ref 0–4)
PDW BLD-RTO: 13.6 % (ref 11.7–14.9)
PLATELET # BLD: 318 K/CU MM (ref 140–440)
PMV BLD AUTO: 10.4 FL (ref 7.5–11.1)
POTASSIUM SERPL-SCNC: 4.1 MMOL/L (ref 3.5–5.1)
RBC # BLD: 4.92 M/CU MM (ref 4.2–5.4)
SEGMENTED NEUTROPHILS ABSOLUTE COUNT: 7.8 K/CU MM
SEGMENTED NEUTROPHILS RELATIVE PERCENT: 62.2 % (ref 36–66)
SODIUM BLD-SCNC: 142 MMOL/L (ref 135–145)
TOTAL IMMATURE NEUTOROPHIL: 0.04 K/CU MM
TOTAL PROTEIN: 7.7 GM/DL (ref 6.4–8.2)
WBC # BLD: 12.6 K/CU MM (ref 4–10.5)

## 2024-01-10 PROCEDURE — 83690 ASSAY OF LIPASE: CPT

## 2024-01-10 PROCEDURE — 6360000004 HC RX CONTRAST MEDICATION: Performed by: EMERGENCY MEDICINE

## 2024-01-10 PROCEDURE — 85025 COMPLETE CBC W/AUTO DIFF WBC: CPT

## 2024-01-10 PROCEDURE — 96375 TX/PRO/DX INJ NEW DRUG ADDON: CPT

## 2024-01-10 PROCEDURE — 2580000003 HC RX 258: Performed by: EMERGENCY MEDICINE

## 2024-01-10 PROCEDURE — 6360000002 HC RX W HCPCS: Performed by: EMERGENCY MEDICINE

## 2024-01-10 PROCEDURE — 6370000000 HC RX 637 (ALT 250 FOR IP): Performed by: EMERGENCY MEDICINE

## 2024-01-10 PROCEDURE — 74177 CT ABD & PELVIS W/CONTRAST: CPT

## 2024-01-10 PROCEDURE — 96374 THER/PROPH/DIAG INJ IV PUSH: CPT

## 2024-01-10 PROCEDURE — 99285 EMERGENCY DEPT VISIT HI MDM: CPT

## 2024-01-10 PROCEDURE — 84703 CHORIONIC GONADOTROPIN ASSAY: CPT

## 2024-01-10 PROCEDURE — 80053 COMPREHEN METABOLIC PANEL: CPT

## 2024-01-10 RX ORDER — DICYCLOMINE HCL 20 MG
20 TABLET ORAL 4 TIMES DAILY
Qty: 40 TABLET | Refills: 0 | Status: SHIPPED | OUTPATIENT
Start: 2024-01-10

## 2024-01-10 RX ORDER — DICYCLOMINE HCL 20 MG
20 TABLET ORAL ONCE
Status: COMPLETED | OUTPATIENT
Start: 2024-01-10 | End: 2024-01-10

## 2024-01-10 RX ORDER — MAGNESIUM HYDROXIDE/ALUMINUM HYDROXICE/SIMETHICONE 120; 1200; 1200 MG/30ML; MG/30ML; MG/30ML
30 SUSPENSION ORAL ONCE
Status: COMPLETED | OUTPATIENT
Start: 2024-01-10 | End: 2024-01-10

## 2024-01-10 RX ORDER — MORPHINE SULFATE 4 MG/ML
4 INJECTION, SOLUTION INTRAMUSCULAR; INTRAVENOUS EVERY 30 MIN PRN
Status: DISCONTINUED | OUTPATIENT
Start: 2024-01-10 | End: 2024-01-10 | Stop reason: HOSPADM

## 2024-01-10 RX ORDER — LOPERAMIDE HYDROCHLORIDE 2 MG/1
2 CAPSULE ORAL 4 TIMES DAILY PRN
Qty: 30 CAPSULE | Refills: 0 | Status: SHIPPED | OUTPATIENT
Start: 2024-01-10 | End: 2024-01-17

## 2024-01-10 RX ORDER — HYDROCODONE BITARTRATE AND ACETAMINOPHEN 5; 325 MG/1; MG/1
1 TABLET ORAL EVERY 6 HOURS PRN
Qty: 10 TABLET | Refills: 0 | Status: SHIPPED | OUTPATIENT
Start: 2024-01-10 | End: 2024-01-13

## 2024-01-10 RX ORDER — ONDANSETRON 4 MG/1
4 TABLET, ORALLY DISINTEGRATING ORAL 3 TIMES DAILY PRN
Qty: 21 TABLET | Refills: 0 | Status: SHIPPED | OUTPATIENT
Start: 2024-01-10

## 2024-01-10 RX ORDER — LOPERAMIDE HYDROCHLORIDE 2 MG/1
2 CAPSULE ORAL ONCE
Status: COMPLETED | OUTPATIENT
Start: 2024-01-10 | End: 2024-01-10

## 2024-01-10 RX ORDER — ONDANSETRON 2 MG/ML
4 INJECTION INTRAMUSCULAR; INTRAVENOUS EVERY 30 MIN PRN
Status: DISCONTINUED | OUTPATIENT
Start: 2024-01-10 | End: 2024-01-10 | Stop reason: HOSPADM

## 2024-01-10 RX ORDER — SODIUM CHLORIDE 9 MG/ML
1000 INJECTION, SOLUTION INTRAVENOUS CONTINUOUS
Status: DISCONTINUED | OUTPATIENT
Start: 2024-01-10 | End: 2024-01-10 | Stop reason: HOSPADM

## 2024-01-10 RX ORDER — 0.9 % SODIUM CHLORIDE 0.9 %
1000 INTRAVENOUS SOLUTION INTRAVENOUS ONCE
Status: COMPLETED | OUTPATIENT
Start: 2024-01-10 | End: 2024-01-10

## 2024-01-10 RX ADMIN — ALUMINUM HYDROXIDE, MAGNESIUM HYDROXIDE, AND SIMETHICONE 30 ML: 200; 200; 20 SUSPENSION ORAL at 09:16

## 2024-01-10 RX ADMIN — IOPAMIDOL 100 ML: 755 INJECTION, SOLUTION INTRAVENOUS at 08:29

## 2024-01-10 RX ADMIN — DICYCLOMINE HYDROCHLORIDE 20 MG: 20 TABLET ORAL at 09:16

## 2024-01-10 RX ADMIN — MORPHINE SULFATE 4 MG: 4 INJECTION, SOLUTION INTRAMUSCULAR; INTRAVENOUS at 07:27

## 2024-01-10 RX ADMIN — SODIUM CHLORIDE 1000 ML: 9 INJECTION, SOLUTION INTRAVENOUS at 07:25

## 2024-01-10 RX ADMIN — LOPERAMIDE HYDROCHLORIDE 2 MG: 2 CAPSULE ORAL at 08:10

## 2024-01-10 RX ADMIN — ONDANSETRON 4 MG: 2 INJECTION INTRAMUSCULAR; INTRAVENOUS at 07:26

## 2024-01-10 ASSESSMENT — PAIN DESCRIPTION - DESCRIPTORS
DESCRIPTORS: CRAMPING
DESCRIPTORS: CRAMPING
DESCRIPTORS: THROBBING;SHARP
DESCRIPTORS: CRAMPING

## 2024-01-10 ASSESSMENT — PAIN DESCRIPTION - LOCATION
LOCATION: ABDOMEN

## 2024-01-10 ASSESSMENT — PAIN - FUNCTIONAL ASSESSMENT
PAIN_FUNCTIONAL_ASSESSMENT: PREVENTS OR INTERFERES SOME ACTIVE ACTIVITIES AND ADLS
PAIN_FUNCTIONAL_ASSESSMENT: 0-10
PAIN_FUNCTIONAL_ASSESSMENT: 0-10
PAIN_FUNCTIONAL_ASSESSMENT: PREVENTS OR INTERFERES SOME ACTIVE ACTIVITIES AND ADLS
PAIN_FUNCTIONAL_ASSESSMENT: PREVENTS OR INTERFERES SOME ACTIVE ACTIVITIES AND ADLS
PAIN_FUNCTIONAL_ASSESSMENT: 0-10
PAIN_FUNCTIONAL_ASSESSMENT: ACTIVITIES ARE NOT PREVENTED

## 2024-01-10 ASSESSMENT — PAIN SCALES - GENERAL
PAINLEVEL_OUTOF10: 4
PAINLEVEL_OUTOF10: 4
PAINLEVEL_OUTOF10: 7
PAINLEVEL_OUTOF10: 3
PAINLEVEL_OUTOF10: 7

## 2024-01-10 ASSESSMENT — PAIN DESCRIPTION - ORIENTATION
ORIENTATION: RIGHT;UPPER
ORIENTATION: RIGHT
ORIENTATION: UPPER;MID;RIGHT

## 2024-01-10 ASSESSMENT — PAIN DESCRIPTION - PAIN TYPE: TYPE: ACUTE PAIN

## 2024-01-10 ASSESSMENT — PAIN DESCRIPTION - FREQUENCY: FREQUENCY: CONTINUOUS

## 2024-01-10 NOTE — ED PROVIDER NOTES
Labs:  Results for orders placed or performed during the hospital encounter of 01/10/24   CBC with Auto Differential   Result Value Ref Range    WBC 12.6 (H) 4.0 - 10.5 K/CU MM    RBC 4.92 4.2 - 5.4 M/CU MM    Hemoglobin 13.9 12.5 - 16.0 GM/DL    Hematocrit 43.8 37 - 47 %    MCV 89.0 78 - 100 FL    MCH 28.3 27 - 31 PG    MCHC 31.7 (L) 32.0 - 36.0 %    RDW 13.6 11.7 - 14.9 %    Platelets 318 140 - 440 K/CU MM    MPV 10.4 7.5 - 11.1 FL    Differential Type AUTOMATED DIFFERENTIAL     Segs Relative 62.2 36 - 66 %    Lymphocytes % 26.7 24 - 44 %    Monocytes % 7.7 (H) 0 - 4 %    Eosinophils % 2.5 0 - 3 %    Basophils % 0.6 0 - 1 %    Segs Absolute 7.8 K/CU MM    Lymphocytes Absolute 3.4 K/CU MM    Monocytes Absolute 1.0 K/CU MM    Eosinophils Absolute 0.3 K/CU MM    Basophils Absolute 0.1 K/CU MM    Immature Neutrophil % 0.3 0 - 0.43 %    Total Immature Neutrophil 0.04 K/CU MM   Comprehensive Metabolic Panel   Result Value Ref Range    Sodium 142 135 - 145 MMOL/L    Potassium 4.1 3.5 - 5.1 MMOL/L    Chloride 103 99 - 110 mMol/L    CO2 26 21 - 32 MMOL/L    Anion Gap 13 7 - 16    Glucose 96 70 - 99 MG/DL    BUN 15 6 - 23 MG/DL    Creatinine 0.5 (L) 0.6 - 1.1 MG/DL    Est, Glom Filt Rate >60 >60 mL/min/1.73m2    Calcium 9.7 8.3 - 10.6 MG/DL    Total Protein 7.7 6.4 - 8.2 GM/DL    Albumin 4.3 3.4 - 5.0 GM/DL    Total Bilirubin 0.2 0.0 - 1.0 MG/DL    Alkaline Phosphatase 106 40 - 129 IU/L    ALT 30 10 - 40 U/L    AST 23 15 - 37 IU/L   Lipase   Result Value Ref Range    Lipase 25 13 - 60 IU/L   HCG Serum, Qualitative   Result Value Ref Range    hCG Qual NEGATIVE            Radiographs (if obtained):  [] The following radiograph was interpreted by myself in the absence of a radiologist:  [x] Radiologist's Report reviewed at time of ED visit:  CT ABDOMEN PELVIS W IV CONTRAST   Preliminary Result   1-2 mm bilateral nonobstructing renal stones without evidence of   hydronephrosis seen.      Grossly unremarkable CT appearance

## 2024-01-10 NOTE — ED NOTES
Discharge instructions reviewed with patient. Reviewed medications with patient. No additional questions asked.  Voiced understanding. Encouraged patient to follow up as discussed by the ED physician.Reviewed how to access [employer] at discharged with patient. Encourage to sign up either on their smartphone or on the computer to be able to review the information form today's and future visits. Voiced understanding. No additional questions asked. Discussed the use of prescribed nausea medication. Encouraged patient to wait 20 to 30 minutes after taking medication before attempting to eat or drink. Recommended clear liquids only for the next 6 to 12 hours then slowly advance diet as tolerated. Patient voiced understanding. No additional questions asked.Discharge instructions reviewed with patient. Medications discussed. Patient informed that medications may cause drowsiness and should not drive or operate equipment while taking this medication. Patient advised to not drink alcohol while taking this medication. Patient also informed that these medications may cause constipation and should increase fluid, fruit, and fiber while taking this medication. Patient voiced understanding. No additional questions asked.

## 2024-01-10 NOTE — DISCHARGE INSTRUCTIONS
Use medication as needed.  Follow-up with your primary care doctor soon as possible.  Return for worsening signs and symptoms.  
18

## 2024-07-10 ENCOUNTER — HOSPITAL ENCOUNTER (EMERGENCY)
Age: 34
Discharge: HOME | End: 2024-07-10
Attending: EMERGENCY MEDICINE
Payer: COMMERCIAL

## 2024-07-10 VITALS
WEIGHT: 276 LBS | SYSTOLIC BLOOD PRESSURE: 121 MMHG | BODY MASS INDEX: 52.11 KG/M2 | TEMPERATURE: 98.5 F | RESPIRATION RATE: 18 BRPM | HEIGHT: 61 IN | OXYGEN SATURATION: 97 % | HEART RATE: 83 BPM | DIASTOLIC BLOOD PRESSURE: 64 MMHG

## 2024-07-10 DIAGNOSIS — L30.9 DERMATITIS: Primary | ICD-10-CM

## 2024-07-10 PROCEDURE — 99283 EMERGENCY DEPT VISIT LOW MDM: CPT

## 2024-07-10 PROCEDURE — 6370000000 HC RX 637 (ALT 250 FOR IP): Performed by: EMERGENCY MEDICINE

## 2024-07-10 RX ORDER — HYDROXYZINE HYDROCHLORIDE 25 MG/1
25 TABLET, FILM COATED ORAL ONCE
Status: COMPLETED | OUTPATIENT
Start: 2024-07-10 | End: 2024-07-10

## 2024-07-10 RX ORDER — HYDROXYZINE 50 MG/1
50 TABLET, FILM COATED ORAL 3 TIMES DAILY PRN
Qty: 24 TABLET | Refills: 0 | Status: SHIPPED | OUTPATIENT
Start: 2024-07-10

## 2024-07-10 RX ORDER — PREDNISONE 50 MG/1
50 TABLET ORAL DAILY
Qty: 5 TABLET | Refills: 0 | Status: SHIPPED | OUTPATIENT
Start: 2024-07-10 | End: 2024-07-15

## 2024-07-10 RX ORDER — PREDNISONE 20 MG/1
60 TABLET ORAL ONCE
Status: COMPLETED | OUTPATIENT
Start: 2024-07-10 | End: 2024-07-10

## 2024-07-10 RX ADMIN — PREDNISONE 60 MG: 20 TABLET ORAL at 02:58

## 2024-07-10 RX ADMIN — HYDROXYZINE HYDROCHLORIDE 25 MG: 25 TABLET, FILM COATED ORAL at 02:58

## 2024-07-10 ASSESSMENT — ENCOUNTER SYMPTOMS
GASTROINTESTINAL NEGATIVE: 1
EYES NEGATIVE: 1
RESPIRATORY NEGATIVE: 1

## 2024-07-10 ASSESSMENT — PAIN - FUNCTIONAL ASSESSMENT: PAIN_FUNCTIONAL_ASSESSMENT: NONE - DENIES PAIN

## 2024-07-10 NOTE — DISCHARGE INSTRUCTIONS
I been that this is a dermatitis that may be caused from heat.  The steroid taper that you are on may have been too low of a dose which is why I am giving you a high dose prednisone.  You will take this for 5 days.  The Atarax will help with the itching it is a different type of medication than that of Benadryl and it works more efficiently at preventing itching type of symptoms.  In addition since I think this may be related to heat I would utilize some kind of a moisture absorbing powder such as Goldbond medicated powder or even just baby powder

## 2024-07-10 NOTE — DISCHARGE INSTR - COC
Continuity of Care Form    Patient Name: Diamond Gregorio   :  1990  MRN:  [id number]    Admit date:  7/10/2024  Discharge date:  ***    Code Status Order: Prior   Advance Directives:     Admitting Physician:  No admitting provider for patient encounter.  PCP: Evette Tobin, ISACC - NP    Discharging Nurse: ***  Discharging Hospital Unit/Room#:   Discharging Unit Phone Number: ***    Emergency Contact:   Extended Emergency Contact Information  Primary Emergency Contact: Derick Gregorio  Address: [address] [place] [address]  Home Phone: [phone]  Relation: Spouse    Past Surgical History:  Past Surgical History:   Procedure Laterality Date    APPENDECTOMY      KNEE SURGERY Right     LAPAROSCOPIC APPENDECTOMY  2018    TONSILLECTOMY         Immunization History:   Immunization History   Administered Date(s) Administered    COVID-19, J&J, (age 18y+), IM, 0.5 mL 2021       Active Problems:  Patient Active Problem List   Diagnosis Code    Acute appendicitis K35.80    Acute appendicitis with localized peritonitis K35.30       Isolation/Infection:   Isolation            No Isolation          Patient Infection Status       None to display            Nurse Assessment:  Last Vital Signs: /64   Pulse 83   Temp 98.5 °F (36.9 °C) (Oral)   Resp 18   Ht 1.549 m (5' 1\")   Wt 125.2 kg (276 lb)   SpO2 97%   BMI 52.15 kg/m²     Last documented pain score (0-10 scale):    Last Weight:   Wt Readings from Last 1 Encounters:   07/10/24 125.2 kg (276 lb)     Mental Status:  {IP PT MENTAL STATUS:}    IV Access:  { AYSE IV ACCESS:421033936}    Nursing Mobility/ADLs:  Walking   {CHP DME ADLs:251917907}  Transfer  {CHP DME ADLs:264918830}  Bathing  {CHP DME ADLs:517466616}  Dressing  {CHP DME ADLs:489526210}  Toileting  {CHP DME ADLs:266118848}  Feeding  {CHP DME ADLs:833553175}  Med Admin  {CHP DME ADLs:557478809}  Med Delivery   { AYSE

## 2024-07-10 NOTE — ED PROVIDER NOTES
Rash  Patient has been treated for a rash on the upper portion of her torso as well as her thighs.  The patient had been given some cortisone cream as well as some steroids but it has not been seem to get a better.  Tonight when the patient was at work there was some intense itching and burning type sensation especially after the patient began having increased sweating over the area    Review of Systems   Constitutional: Negative.    HENT: Negative.     Eyes: Negative.    Respiratory: Negative.     Cardiovascular: Negative.    Gastrointestinal: Negative.    Genitourinary: Negative.    Musculoskeletal: Negative.    Skin:  Positive for rash.   Neurological: Negative.    All other systems reviewed and are negative.      Family History   Problem Relation Age of Onset    Diabetes Father     Heart Disease Father      Social History     Socioeconomic History    Marital status:      Spouse name: Not on file    Number of children: Not on file    Years of education: Not on file    Highest education level: Not on file   Occupational History    Not on file   Tobacco Use    Smoking status: Never    Smokeless tobacco: Never   Vaping Use    Vaping Use: Never used   Substance and Sexual Activity    Alcohol use: No    Drug use: No    Sexual activity: Yes     Partners: Male   Other Topics Concern    Not on file   Social History Narrative    Not on file     Social Determinants of Health     Financial Resource Strain: Not on file   Food Insecurity: Not on file   Transportation Needs: Not on file   Physical Activity: Not on file   Stress: Not on file   Social Connections: Not on file   Intimate Partner Violence: Not on file   Housing Stability: Not on file     Past Surgical History:   Procedure Laterality Date    APPENDECTOMY      KNEE SURGERY Right     LAPAROSCOPIC APPENDECTOMY  05/14/2018    TONSILLECTOMY       Past Medical History:   Diagnosis Date    Fatty liver     Headache     PCOS (polycystic ovarian syndrome)

## 2024-10-17 ENCOUNTER — HOSPITAL ENCOUNTER (EMERGENCY)
Age: 34
Discharge: HOME | End: 2024-10-17
Attending: EMERGENCY MEDICINE
Payer: COMMERCIAL

## 2024-10-17 VITALS
HEART RATE: 75 BPM | DIASTOLIC BLOOD PRESSURE: 69 MMHG | OXYGEN SATURATION: 99 % | TEMPERATURE: 98.1 F | SYSTOLIC BLOOD PRESSURE: 114 MMHG | RESPIRATION RATE: 16 BRPM

## 2024-10-17 DIAGNOSIS — G43.909 MIGRAINE WITHOUT STATUS MIGRAINOSUS, NOT INTRACTABLE, UNSPECIFIED MIGRAINE TYPE: Primary | ICD-10-CM

## 2024-10-17 PROCEDURE — 96372 THER/PROPH/DIAG INJ SC/IM: CPT

## 2024-10-17 PROCEDURE — 99284 EMERGENCY DEPT VISIT MOD MDM: CPT

## 2024-10-17 PROCEDURE — 6360000002 HC RX W HCPCS: Performed by: EMERGENCY MEDICINE

## 2024-10-17 RX ORDER — KETOROLAC TROMETHAMINE 30 MG/ML
30 INJECTION, SOLUTION INTRAMUSCULAR; INTRAVENOUS ONCE
Status: COMPLETED | OUTPATIENT
Start: 2024-10-17 | End: 2024-10-17

## 2024-10-17 RX ORDER — MORPHINE SULFATE 4 MG/ML
4 INJECTION, SOLUTION INTRAMUSCULAR; INTRAVENOUS ONCE
Status: COMPLETED | OUTPATIENT
Start: 2024-10-17 | End: 2024-10-17

## 2024-10-17 RX ORDER — DROPERIDOL 2.5 MG/ML
1.25 INJECTION, SOLUTION INTRAMUSCULAR; INTRAVENOUS ONCE
Status: COMPLETED | OUTPATIENT
Start: 2024-10-17 | End: 2024-10-17

## 2024-10-17 RX ADMIN — KETOROLAC TROMETHAMINE 30 MG: 30 INJECTION, SOLUTION INTRAMUSCULAR at 03:56

## 2024-10-17 RX ADMIN — DROPERIDOL 1.25 MG: 2.5 INJECTION, SOLUTION INTRAMUSCULAR; INTRAVENOUS at 03:57

## 2024-10-17 RX ADMIN — MORPHINE SULFATE 4 MG: 4 INJECTION, SOLUTION INTRAMUSCULAR; INTRAVENOUS at 03:57

## 2024-10-17 ASSESSMENT — ENCOUNTER SYMPTOMS
TINGLING: 0
NAUSEA: 1
VOMITING: 0
COUGH: 0
PHOTOPHOBIA: 1
RESPIRATORY NEGATIVE: 1
BLURRED VISION: 1

## 2024-10-17 ASSESSMENT — PAIN SCALES - GENERAL
PAINLEVEL_OUTOF10: 9
PAINLEVEL_OUTOF10: 10

## 2024-10-17 ASSESSMENT — PAIN - FUNCTIONAL ASSESSMENT: PAIN_FUNCTIONAL_ASSESSMENT: 0-10

## 2024-10-17 ASSESSMENT — PAIN DESCRIPTION - PAIN TYPE: TYPE: ACUTE PAIN

## 2024-10-17 ASSESSMENT — LIFESTYLE VARIABLES
HOW MANY STANDARD DRINKS CONTAINING ALCOHOL DO YOU HAVE ON A TYPICAL DAY: PATIENT DOES NOT DRINK
HOW OFTEN DO YOU HAVE A DRINK CONTAINING ALCOHOL: MONTHLY OR LESS

## 2024-10-17 ASSESSMENT — PAIN DESCRIPTION - LOCATION: LOCATION: HEAD

## 2024-10-17 NOTE — ED PROVIDER NOTES
patient's presentation and possible differential diagnoses that could be associated with this presentation.  I also discussed at length concerning worsening findings, new signs and symptoms that may warrant repeat examination as well as the patient just returning if they are not feeling any better or having problems with follow-up   In addition, risk, benefits, and side effects of medicines were discussed ,  and agreement with plan was verbalized.  For any new medications prescribed today, Education about indications for the medication, how to take the medication, and potential side effects of the medication were discussed and no questions or concerns were verbalized.    I am the Primary Clinician of Record.        Final Impression    1. Migraine without status migrainosus, not intractable, unspecified migraine type              Buzz Romeo,   10/17/24 0438